# Patient Record
Sex: FEMALE | Race: WHITE | Employment: OTHER | URBAN - METROPOLITAN AREA
[De-identification: names, ages, dates, MRNs, and addresses within clinical notes are randomized per-mention and may not be internally consistent; named-entity substitution may affect disease eponyms.]

---

## 2017-02-21 ENCOUNTER — HOSPITAL ENCOUNTER (OUTPATIENT)
Dept: GENERAL RADIOLOGY | Age: 77
Discharge: HOME | End: 2017-02-21
Payer: MEDICARE

## 2017-02-21 DIAGNOSIS — R52 PAIN: ICD-10-CM

## 2017-02-21 PROCEDURE — 72110 X-RAY EXAM L-2 SPINE 4/>VWS: CPT

## 2017-06-29 ENCOUNTER — OFFICE VISIT (OUTPATIENT)
Dept: SURGERY | Age: 77
End: 2017-06-29

## 2017-06-29 VITALS
TEMPERATURE: 98.8 F | HEART RATE: 80 BPM | WEIGHT: 113 LBS | RESPIRATION RATE: 12 BRPM | DIASTOLIC BLOOD PRESSURE: 62 MMHG | HEIGHT: 60 IN | BODY MASS INDEX: 22.19 KG/M2 | SYSTOLIC BLOOD PRESSURE: 98 MMHG

## 2017-06-29 DIAGNOSIS — L98.9 SKIN LESION OF LEFT UPPER EXTREMITY: Primary | ICD-10-CM

## 2017-06-29 PROCEDURE — 99213 OFFICE O/P EST LOW 20 MIN: CPT | Performed by: SURGERY

## 2017-06-29 RX ORDER — HYDROMORPHONE HYDROCHLORIDE 4 MG/1
TABLET ORAL
COMMUNITY
Start: 2017-06-28 | End: 2018-04-11 | Stop reason: ALTCHOICE

## 2017-06-29 ASSESSMENT — ENCOUNTER SYMPTOMS
CONSTIPATION: 0
ABDOMINAL PAIN: 0
EYE PAIN: 0
VOMITING: 0
ANAL BLEEDING: 0
STRIDOR: 0
SHORTNESS OF BREATH: 0
BACK PAIN: 0
EYE DISCHARGE: 0
TROUBLE SWALLOWING: 0
CHEST TIGHTNESS: 0
COLOR CHANGE: 0

## 2017-07-06 ENCOUNTER — OFFICE VISIT (OUTPATIENT)
Dept: SURGERY | Age: 77
End: 2017-07-06

## 2017-07-06 VITALS
TEMPERATURE: 98.8 F | BODY MASS INDEX: 22.38 KG/M2 | SYSTOLIC BLOOD PRESSURE: 110 MMHG | WEIGHT: 114 LBS | DIASTOLIC BLOOD PRESSURE: 80 MMHG | HEART RATE: 96 BPM | HEIGHT: 60 IN | RESPIRATION RATE: 12 BRPM

## 2017-07-06 DIAGNOSIS — L98.9 SKIN LESION OF LEFT UPPER EXTREMITY: Primary | ICD-10-CM

## 2017-07-06 PROCEDURE — 99024 POSTOP FOLLOW-UP VISIT: CPT | Performed by: SURGERY

## 2017-09-20 LAB — PATHOLOGY REPORT: NORMAL

## 2018-04-04 ENCOUNTER — TELEPHONE (OUTPATIENT)
Dept: INFECTIOUS DISEASES | Age: 78
End: 2018-04-04

## 2018-04-04 DIAGNOSIS — R19.7 DIARRHEA IN ADULT PATIENT: Primary | ICD-10-CM

## 2018-04-04 RX ORDER — VANCOMYCIN HYDROCHLORIDE 250 MG/1
CAPSULE ORAL
Qty: 28 CAPSULE | Refills: 0 | Status: SHIPPED | OUTPATIENT
Start: 2018-04-04 | End: 2018-04-11 | Stop reason: SDUPTHER

## 2018-04-06 ENCOUNTER — TELEPHONE (OUTPATIENT)
Dept: INFECTIOUS DISEASES | Age: 78
End: 2018-04-06

## 2018-04-11 ENCOUNTER — OFFICE VISIT (OUTPATIENT)
Dept: INFECTIOUS DISEASES | Age: 78
End: 2018-04-11
Payer: MEDICARE

## 2018-04-11 VITALS
RESPIRATION RATE: 18 BRPM | BODY MASS INDEX: 20.62 KG/M2 | WEIGHT: 105 LBS | SYSTOLIC BLOOD PRESSURE: 110 MMHG | TEMPERATURE: 98.7 F | HEART RATE: 72 BPM | DIASTOLIC BLOOD PRESSURE: 68 MMHG | HEIGHT: 60 IN

## 2018-04-11 DIAGNOSIS — R10.12 LEFT UPPER QUADRANT PAIN: ICD-10-CM

## 2018-04-11 DIAGNOSIS — E55.9 VITAMIN D DEFICIENCY: ICD-10-CM

## 2018-04-11 DIAGNOSIS — R53.83 FATIGUE, UNSPECIFIED TYPE: ICD-10-CM

## 2018-04-11 DIAGNOSIS — R19.7 DIARRHEA OF PRESUMED INFECTIOUS ORIGIN: Primary | ICD-10-CM

## 2018-04-11 PROCEDURE — 99204 OFFICE O/P NEW MOD 45 MIN: CPT | Performed by: INTERNAL MEDICINE

## 2018-04-11 RX ORDER — VANCOMYCIN HYDROCHLORIDE 250 MG/1
CAPSULE ORAL
Qty: 28 CAPSULE | Refills: 0 | Status: SHIPPED | OUTPATIENT
Start: 2018-04-11 | End: 2018-04-11 | Stop reason: SDUPTHER

## 2018-04-11 RX ORDER — VANCOMYCIN HYDROCHLORIDE 250 MG/1
CAPSULE ORAL
Qty: 28 CAPSULE | Refills: 0 | Status: SHIPPED | OUTPATIENT
Start: 2018-04-11 | End: 2019-04-25 | Stop reason: ALTCHOICE

## 2018-04-13 ENCOUNTER — HOSPITAL ENCOUNTER (OUTPATIENT)
Dept: LAB | Age: 78
Discharge: HOME | End: 2018-04-13
Payer: MEDICARE

## 2018-04-13 ENCOUNTER — TELEPHONE (OUTPATIENT)
Dept: INFECTIOUS DISEASES | Age: 78
End: 2018-04-13

## 2018-04-13 PROCEDURE — 87493 C DIFF AMPLIFIED PROBE: CPT

## 2018-04-14 LAB — CLOSTRIDIUM DIFFICILE DNA AMPLIFICATION: NORMAL

## 2018-04-17 ENCOUNTER — HOSPITAL ENCOUNTER (OUTPATIENT)
Dept: LAB | Age: 78
Discharge: HOME | End: 2018-04-17
Payer: MEDICARE

## 2018-04-17 LAB
ALBUMIN SERPL-MCNC: 4.6 G/DL (ref 3.9–4.9)
ALP BLD-CCNC: 75 U/L (ref 40–130)
ALT SERPL-CCNC: 48 U/L (ref 0–33)
ANION GAP SERPL CALCULATED.3IONS-SCNC: 13 MEQ/L (ref 7–13)
AST SERPL-CCNC: 33 U/L (ref 0–35)
BASOPHILS ABSOLUTE: 0 K/UL (ref 0–0.2)
BASOPHILS RELATIVE PERCENT: 0.8 %
BILIRUB SERPL-MCNC: 0.3 MG/DL (ref 0–1.2)
BUN BLDV-MCNC: 15 MG/DL (ref 8–23)
CALCIUM SERPL-MCNC: 9.7 MG/DL (ref 8.6–10.2)
CHLORIDE BLD-SCNC: 106 MEQ/L (ref 98–107)
CO2: 26 MEQ/L (ref 22–29)
CREAT SERPL-MCNC: 0.63 MG/DL (ref 0.5–0.9)
EOSINOPHILS ABSOLUTE: 0.1 K/UL (ref 0–0.7)
EOSINOPHILS RELATIVE PERCENT: 2.4 %
GFR AFRICAN AMERICAN: >60
GFR NON-AFRICAN AMERICAN: >60
GLOBULIN: 2.2 G/DL (ref 2.3–3.5)
GLUCOSE BLD-MCNC: 53 MG/DL (ref 74–109)
HCT VFR BLD CALC: 46.3 % (ref 37–47)
HEMOGLOBIN: 15.9 G/DL (ref 12–16)
LIPASE: 8 U/L (ref 13–60)
LYMPHOCYTES ABSOLUTE: 1.5 K/UL (ref 1–4.8)
LYMPHOCYTES RELATIVE PERCENT: 25 %
MCH RBC QN AUTO: 32.4 PG (ref 27–31.3)
MCHC RBC AUTO-ENTMCNC: 34.3 % (ref 33–37)
MCV RBC AUTO: 94.6 FL (ref 82–100)
MONOCYTES ABSOLUTE: 0.5 K/UL (ref 0.2–0.8)
MONOCYTES RELATIVE PERCENT: 8.1 %
NEUTROPHILS ABSOLUTE: 3.7 K/UL (ref 1.4–6.5)
NEUTROPHILS RELATIVE PERCENT: 63.7 %
PDW BLD-RTO: 13.3 % (ref 11.5–14.5)
PLATELET # BLD: 131 K/UL (ref 130–400)
POTASSIUM SERPL-SCNC: 4 MEQ/L (ref 3.5–5.1)
RBC # BLD: 4.89 M/UL (ref 4.2–5.4)
SODIUM BLD-SCNC: 145 MEQ/L (ref 132–144)
TOTAL PROTEIN: 6.8 G/DL (ref 6.4–8.1)
TSH REFLEX: 2.55 UIU/ML (ref 0.27–4.2)
VITAMIN D 25-HYDROXY: 9.5 NG/ML (ref 30–100)
WBC # BLD: 5.9 K/UL (ref 4.8–10.8)

## 2018-04-17 PROCEDURE — 80053 COMPREHEN METABOLIC PANEL: CPT

## 2018-04-17 PROCEDURE — 82306 VITAMIN D 25 HYDROXY: CPT

## 2018-04-17 PROCEDURE — 83690 ASSAY OF LIPASE: CPT

## 2018-04-17 PROCEDURE — 84443 ASSAY THYROID STIM HORMONE: CPT

## 2018-04-17 PROCEDURE — 85025 COMPLETE CBC W/AUTO DIFF WBC: CPT

## 2018-04-21 LAB
VITAMIN D2 AND D3, TOTAL: 7.2 NG/ML (ref 30–80)
VITAMIN D2, 25 HYDROXY: <1 NG/ML
VITAMIN D3,25 HYDROXY: 7.2 NG/ML

## 2018-04-27 ENCOUNTER — OFFICE VISIT (OUTPATIENT)
Dept: INFECTIOUS DISEASES | Age: 78
End: 2018-04-27
Payer: MEDICARE

## 2018-04-27 VITALS
RESPIRATION RATE: 16 BRPM | SYSTOLIC BLOOD PRESSURE: 112 MMHG | HEIGHT: 60 IN | WEIGHT: 110 LBS | TEMPERATURE: 98.4 F | HEART RATE: 60 BPM | BODY MASS INDEX: 21.6 KG/M2 | DIASTOLIC BLOOD PRESSURE: 65 MMHG

## 2018-04-27 DIAGNOSIS — R19.7 DIARRHEA OF PRESUMED INFECTIOUS ORIGIN: ICD-10-CM

## 2018-04-27 DIAGNOSIS — R79.89 LOW VITAMIN D LEVEL: Primary | ICD-10-CM

## 2018-04-27 PROCEDURE — 99214 OFFICE O/P EST MOD 30 MIN: CPT | Performed by: INTERNAL MEDICINE

## 2018-04-27 RX ORDER — ERGOCALCIFEROL (VITAMIN D2) 1250 MCG
50000 CAPSULE ORAL WEEKLY
Qty: 4 CAPSULE | Refills: 2 | Status: SHIPPED | OUTPATIENT
Start: 2018-04-27 | End: 2019-04-25 | Stop reason: ALTCHOICE

## 2018-07-10 ENCOUNTER — OFFICE VISIT (OUTPATIENT)
Dept: OBGYN CLINIC | Age: 78
End: 2018-07-10
Payer: MEDICARE

## 2018-07-10 VITALS
HEIGHT: 60 IN | BODY MASS INDEX: 21.4 KG/M2 | WEIGHT: 109 LBS | SYSTOLIC BLOOD PRESSURE: 124 MMHG | DIASTOLIC BLOOD PRESSURE: 80 MMHG

## 2018-07-10 DIAGNOSIS — Z01.419 WELL WOMAN EXAM WITH ROUTINE GYNECOLOGICAL EXAM: Primary | ICD-10-CM

## 2018-07-10 DIAGNOSIS — Z12.39 ENCOUNTER FOR BREAST CANCER SCREENING OTHER THAN MAMMOGRAM: ICD-10-CM

## 2018-07-10 DIAGNOSIS — N95.9 MENOPAUSAL AND PERIMENOPAUSAL DISORDER: ICD-10-CM

## 2018-07-10 DIAGNOSIS — Z87.81 HX OF FRACTURE: ICD-10-CM

## 2018-07-10 DIAGNOSIS — Z11.51 SCREENING FOR HPV (HUMAN PAPILLOMAVIRUS): ICD-10-CM

## 2018-07-10 PROCEDURE — 99387 INIT PM E/M NEW PAT 65+ YRS: CPT | Performed by: OBSTETRICS & GYNECOLOGY

## 2018-07-10 RX ORDER — TRAZODONE HYDROCHLORIDE 50 MG/1
TABLET ORAL
Refills: 2 | COMMUNITY
Start: 2018-06-12 | End: 2019-04-25

## 2018-07-10 RX ORDER — ROSUVASTATIN CALCIUM 10 MG/1
TABLET, COATED ORAL
COMMUNITY

## 2018-07-10 RX ORDER — HYDROMORPHONE HYDROCHLORIDE 2 MG/1
TABLET ORAL
Refills: 0 | COMMUNITY
Start: 2018-06-18 | End: 2019-04-25 | Stop reason: ALTCHOICE

## 2018-07-10 RX ORDER — METOPROLOL SUCCINATE 50 MG/1
50 TABLET, EXTENDED RELEASE ORAL NIGHTLY
COMMUNITY
Start: 2009-04-09

## 2018-07-10 RX ORDER — ASPIRIN 81 MG/1
TABLET ORAL
COMMUNITY
Start: 2010-08-18

## 2018-07-10 ASSESSMENT — ENCOUNTER SYMPTOMS
ABDOMINAL PAIN: 0
NAUSEA: 0
BLOOD IN STOOL: 0
VOMITING: 0
WHEEZING: 0
SHORTNESS OF BREATH: 0
COUGH: 0
SINUS PRESSURE: 0
CONSTIPATION: 0
COLOR CHANGE: 0

## 2018-07-10 NOTE — PROGRESS NOTES
 Alcohol use No    Drug use: No    Sexual activity: Not Currently     Other Topics Concern    Not on file     Social History Narrative    No narrative on file         MEDICATIONS:  Current Outpatient Prescriptions on File Prior to Visit   Medication Sig Dispense Refill    ergocalciferol (ERGOCALCIFEROL) 57895 units capsule Take 1 capsule by mouth once a week 4 capsule 2    vancomycin (VANCOCIN HCL) 250 MG capsule TAKE 250 MG ,PO, QID,FOR X7 DAYS 28 capsule 0     No current facility-administered medications on file prior to visit. ALLERGIES:  Allergies as of 07/10/2018 - Review Complete 07/10/2018   Allergen Reaction Noted    Zithromax [azithromycin] Other (See Comments) 07/10/2018    Flexeril [cyclobenzaprine hcl]  07/03/2012           Gynecologic History:     No LMP recorded. Patient has had a hysterectomy. ________________________________________________________________________  REVIEW OF SYSTEMS:       Review of Systems   Constitutional: Negative for chills, fatigue, fever and unexpected weight change. HENT: Negative for hearing loss, sinus pressure and tinnitus. Eyes: Negative for visual disturbance. Respiratory: Negative for cough, shortness of breath and wheezing. Cardiovascular: Negative for chest pain, palpitations and leg swelling. Gastrointestinal: Negative for abdominal pain, blood in stool, constipation, nausea and vomiting. Genitourinary: Negative for difficulty urinating, dysuria, flank pain, hematuria, pelvic pain and vaginal discharge. Musculoskeletal: Negative for arthralgias and neck stiffness. Skin: Negative for color change, pallor and rash. Allergic/Immunologic: Negative for food allergies. Neurological: Negative for dizziness, speech difficulty, weakness and numbness. Psychiatric/Behavioral: Negative for behavioral problems, self-injury and suicidal ideas. The patient is not nervous/anxious. DIGITAL SCREEN W CAD BILATERAL                  Hereditary Breast, Ovarian, Colon and Uterine Cancer screening Done. Tobacco & Secondary smoke risks reviewed; instructed on cessation and avoidance      Counseling Completed:          PLAN:    Return in about 1 year (around 7/10/2019) for annual.  Repeat Annual every 1 year  Cervical Cytology Evaluation begins at 24years old. If Negative Cytology, Follow-up screening per current guidelines. Mammograms every 1 year. If 35 yo and last mammogram was negative. Calcium and Vitamin D dosing reviewed. Colonoscopy screening reviewed as well as onset for bone density testing. Birth control and barrier recommendations discussed. STD counseling and prevention reviewed. Gardisil counseling completed for all patients 7-35 yo. Routine health maintenance per patients PCP. Orders Placed This Encounter   Procedures    DUC DIGITAL SCREEN W CAD BILATERAL     Standing Status:   Future     Standing Expiration Date:   7/10/2019     Order Specific Question:   Reason for exam:     Answer:   BREAST CANCER SCREENING    PAP SMEAR     Standing Status:   Future     Standing Expiration Date:   7/10/2019     Order Specific Question:   Collection Type     Answer: Thin Prep     Order Specific Question:   Prior Abnormal Pap Test     Answer:   No     Order Specific Question:   Screening or Diagnostic     Answer:   Screening     Order Specific Question:   HPV Requested? Answer:   Yes     Comments:   HPV 16/18     Order Specific Question:   High Risk Patient     Answer:   N/A     No orders of the defined types were placed in this encounter. ISweta, am scribing for, and in the presence of, Dr Andre Cross. Electronically signed by: Sweta Rivera 7/10/18 1:03 PM    Dr Andre PAYNE, personally performed the services described in this documentation, as scribed by Sweta Rivera in my presence, and it is both accurate and complete.  Electronically signed

## 2018-07-12 LAB — VITAMIN D 1,25-DIHYDROXY: 45.4 PG/ML (ref 19.9–79.3)

## 2018-07-17 ENCOUNTER — HOSPITAL ENCOUNTER (OUTPATIENT)
Dept: WOMENS IMAGING | Age: 78
Discharge: HOME | End: 2018-07-19
Payer: MEDICARE

## 2018-07-17 DIAGNOSIS — Z87.81 HX OF FRACTURE: ICD-10-CM

## 2018-07-17 DIAGNOSIS — Z12.39 ENCOUNTER FOR BREAST CANCER SCREENING OTHER THAN MAMMOGRAM: ICD-10-CM

## 2018-07-17 DIAGNOSIS — N95.9 MENOPAUSAL AND PERIMENOPAUSAL DISORDER: ICD-10-CM

## 2018-07-17 PROCEDURE — 77067 SCR MAMMO BI INCL CAD: CPT

## 2018-07-17 PROCEDURE — 77080 DXA BONE DENSITY AXIAL: CPT

## 2018-07-18 DIAGNOSIS — Z01.419 WELL WOMAN EXAM WITH ROUTINE GYNECOLOGICAL EXAM: ICD-10-CM

## 2018-07-18 DIAGNOSIS — Z11.51 SCREENING FOR HPV (HUMAN PAPILLOMAVIRUS): ICD-10-CM

## 2018-07-20 ENCOUNTER — HOSPITAL ENCOUNTER (OUTPATIENT)
Dept: LAB | Age: 78
Discharge: HOME | End: 2018-07-20
Payer: MEDICARE

## 2018-07-20 LAB
ALBUMIN SERPL-MCNC: 4.2 G/DL (ref 3.9–4.9)
ALP BLD-CCNC: 70 U/L (ref 40–130)
ALT SERPL-CCNC: 40 U/L (ref 0–33)
ANION GAP SERPL CALCULATED.3IONS-SCNC: 12 MEQ/L (ref 7–13)
ANISOCYTOSIS: ABNORMAL
AST SERPL-CCNC: 26 U/L (ref 0–35)
BANDED NEUTROPHILS RELATIVE PERCENT: 5 % (ref 5–11)
BASOPHILS ABSOLUTE: 0.2 K/UL (ref 0–0.2)
BASOPHILS RELATIVE PERCENT: 4 %
BILIRUB SERPL-MCNC: 0.6 MG/DL (ref 0–1.2)
BUN BLDV-MCNC: 17 MG/DL (ref 8–23)
CALCIUM SERPL-MCNC: 9.2 MG/DL (ref 8.6–10.2)
CHLORIDE BLD-SCNC: 102 MEQ/L (ref 98–107)
CHOLESTEROL, TOTAL: 122 MG/DL (ref 0–199)
CO2: 27 MEQ/L (ref 22–29)
CREAT SERPL-MCNC: 0.57 MG/DL (ref 0.5–0.9)
EOSINOPHILS ABSOLUTE: 0.1 K/UL (ref 0–0.7)
EOSINOPHILS RELATIVE PERCENT: 3 %
GFR AFRICAN AMERICAN: >60
GFR NON-AFRICAN AMERICAN: >60
GLOBULIN: 2.6 G/DL (ref 2.3–3.5)
GLUCOSE BLD-MCNC: 72 MG/DL (ref 74–109)
HBA1C MFR BLD: 5.5 % (ref 4.8–5.9)
HCT VFR BLD CALC: 43.8 % (ref 37–47)
HDLC SERPL-MCNC: 56 MG/DL (ref 40–59)
HEMOGLOBIN: 15 G/DL (ref 12–16)
LDL CHOLESTEROL CALCULATED: 52 MG/DL (ref 0–129)
LYMPHOCYTES ABSOLUTE: 1.5 K/UL (ref 1–4.8)
LYMPHOCYTES RELATIVE PERCENT: 32 %
MCH RBC QN AUTO: 32.7 PG (ref 27–31.3)
MCHC RBC AUTO-ENTMCNC: 34.3 % (ref 33–37)
MCV RBC AUTO: 95.2 FL (ref 82–100)
MONOCYTES ABSOLUTE: 0.5 K/UL (ref 0.2–0.8)
MONOCYTES RELATIVE PERCENT: 9.5 %
NEUTROPHILS ABSOLUTE: 2.4 K/UL (ref 1.4–6.5)
NEUTROPHILS RELATIVE PERCENT: 47 %
PDW BLD-RTO: 13.5 % (ref 11.5–14.5)
PLATELET # BLD: 94 K/UL (ref 130–400)
PLATELET SLIDE REVIEW: ABNORMAL
POIKILOCYTES: ABNORMAL
POTASSIUM SERPL-SCNC: 3.8 MEQ/L (ref 3.5–5.1)
RBC # BLD: 4.6 M/UL (ref 4.2–5.4)
SODIUM BLD-SCNC: 141 MEQ/L (ref 132–144)
T3 TOTAL: 1.35 NG/ML (ref 0.8–2)
TOTAL PROTEIN: 6.8 G/DL (ref 6.4–8.1)
TRIGL SERPL-MCNC: 68 MG/DL (ref 0–200)
TSH REFLEX: 2.78 UIU/ML (ref 0.27–4.2)
WBC # BLD: 4.6 K/UL (ref 4.8–10.8)

## 2018-07-20 PROCEDURE — 85025 COMPLETE CBC W/AUTO DIFF WBC: CPT

## 2018-07-20 PROCEDURE — 84443 ASSAY THYROID STIM HORMONE: CPT

## 2018-07-20 PROCEDURE — 84480 ASSAY TRIIODOTHYRONINE (T3): CPT

## 2018-07-20 PROCEDURE — 80053 COMPREHEN METABOLIC PANEL: CPT

## 2018-07-20 PROCEDURE — 80061 LIPID PANEL: CPT

## 2018-07-20 PROCEDURE — 83036 HEMOGLOBIN GLYCOSYLATED A1C: CPT

## 2018-08-07 ENCOUNTER — OFFICE VISIT (OUTPATIENT)
Dept: OBGYN CLINIC | Age: 78
End: 2018-08-07
Payer: MEDICARE

## 2018-08-07 VITALS
DIASTOLIC BLOOD PRESSURE: 72 MMHG | SYSTOLIC BLOOD PRESSURE: 128 MMHG | WEIGHT: 110.4 LBS | HEIGHT: 60 IN | BODY MASS INDEX: 21.68 KG/M2

## 2018-08-07 DIAGNOSIS — M81.0 OSTEOPOROSIS, UNSPECIFIED OSTEOPOROSIS TYPE, UNSPECIFIED PATHOLOGICAL FRACTURE PRESENCE: ICD-10-CM

## 2018-08-07 DIAGNOSIS — R87.612 LGSIL OF CERVIX OF UNDETERMINED SIGNIFICANCE: Primary | ICD-10-CM

## 2018-08-07 PROCEDURE — 99213 OFFICE O/P EST LOW 20 MIN: CPT | Performed by: OBSTETRICS & GYNECOLOGY

## 2018-08-07 RX ORDER — NICOTINE 21 MG/24HR
1 PATCH, TRANSDERMAL 24 HOURS TRANSDERMAL
COMMUNITY
Start: 2018-07-24

## 2018-08-07 ASSESSMENT — ENCOUNTER SYMPTOMS
SHORTNESS OF BREATH: 0
COUGH: 0
BLOOD IN STOOL: 0
ABDOMINAL PAIN: 0
WHEEZING: 0
DIARRHEA: 0
CONSTIPATION: 0

## 2018-09-19 ENCOUNTER — HOSPITAL ENCOUNTER (OUTPATIENT)
Dept: LAB | Age: 78
Discharge: HOME | End: 2018-09-19
Payer: MEDICARE

## 2018-11-13 ENCOUNTER — PROCEDURE VISIT (OUTPATIENT)
Dept: OBGYN CLINIC | Age: 78
End: 2018-11-13
Payer: MEDICARE

## 2018-11-13 VITALS — SYSTOLIC BLOOD PRESSURE: 126 MMHG | WEIGHT: 100 LBS | DIASTOLIC BLOOD PRESSURE: 82 MMHG | BODY MASS INDEX: 19.53 KG/M2

## 2018-11-13 DIAGNOSIS — R87.622 LGSIL PAP SMEAR OF VAGINA: Primary | ICD-10-CM

## 2018-11-13 DIAGNOSIS — Z23 NEED FOR INFLUENZA VACCINATION: ICD-10-CM

## 2018-11-13 PROCEDURE — G0008 ADMIN INFLUENZA VIRUS VAC: HCPCS | Performed by: OBSTETRICS & GYNECOLOGY

## 2018-11-13 PROCEDURE — 57420 EXAM OF VAGINA W/SCOPE: CPT | Performed by: OBSTETRICS & GYNECOLOGY

## 2018-11-13 PROCEDURE — 90662 IIV NO PRSV INCREASED AG IM: CPT | Performed by: OBSTETRICS & GYNECOLOGY

## 2018-11-13 ASSESSMENT — ENCOUNTER SYMPTOMS
RESPIRATORY NEGATIVE: 1
CONSTIPATION: 0
BLOOD IN STOOL: 0
VOMITING: 0
DIARRHEA: 0
EYES NEGATIVE: 1
ANAL BLEEDING: 0
NAUSEA: 0
ABDOMINAL PAIN: 0
RECTAL PAIN: 0
ABDOMINAL DISTENTION: 0
ALLERGIC/IMMUNOLOGIC NEGATIVE: 1

## 2018-11-13 NOTE — PROGRESS NOTES
Pt here for colposcopy today for LGSIL of vaginal cuff. HPV negative. Discussed procedure as well as abnormal paps and HPV in detail. Consents signed. Colposcopy of vaginal cuff negative and no bx done. Discussed with patient and will F/U pap 6 months. All questions answered. Vitals:  /82   Wt 100 lb (45.4 kg)   BMI 19.53 kg/m²   Past Medical History:   Diagnosis Date    C. difficile colitis     Hyperlipidemia     Hypertension     Osteoporosis 2018     Past Surgical History:   Procedure Laterality Date    COLONOSCOPY  1/25/16    fegal transplant     HYSTERECTOMY      OTHER SURGICAL HISTORY Left 07/03/2017    Yonny Kelley MD EXCISION SKIN LESION L FOREARM    PANCREAS SURGERY       Allergies:  Zithromax [azithromycin] and Flexeril [cyclobenzaprine hcl]  Current Outpatient Prescriptions   Medication Sig Dispense Refill    denosumab (PROLIA) 60 MG/ML SOLN SC injection Inject 1 mL into the skin once for 1 dose 1 mL 1    nicotine (NICODERM CQ) 21 MG/24HR Place 1 patch onto the skin      HYDROmorphone (DILAUDID) 2 MG tablet TAKE 1 & 1/2 (ONE AND ONE-HALF) TABLETS BY MOUTH ONCE DAILY AS NEEDED FOR PAIN  0    aspirin (ECOTRIN LOW STRENGTH) 81 MG EC tablet Take by mouth      rosuvastatin (CRESTOR) 10 MG tablet 1 TABLET DAILY      metoprolol succinate (TOPROL XL) 50 MG extended release tablet Take by mouth      traZODone (DESYREL) 50 MG tablet Take 2 tablet by mouth at bedtime as needed  2    lipase-protease-amylase (CREON) 19609-30575 units delayed release capsule Take by mouth      LYRICA 150 MG capsule Take 1 capsule by mouth three times a day  2    Misc. Devices (ADAPTER 2 RED TAPPETS) MISC by Does not apply route      ergocalciferol (ERGOCALCIFEROL) 38399 units capsule Take 1 capsule by mouth once a week 4 capsule 2    vancomycin (VANCOCIN HCL) 250 MG capsule TAKE 250 MG ,PO, QID,FOR X7 DAYS 28 capsule 0     No current facility-administered medications for this visit.

## 2019-07-22 ENCOUNTER — OFFICE VISIT (OUTPATIENT)
Dept: OBGYN CLINIC | Age: 79
End: 2019-07-22

## 2019-07-22 VITALS
WEIGHT: 120 LBS | SYSTOLIC BLOOD PRESSURE: 114 MMHG | HEIGHT: 60 IN | DIASTOLIC BLOOD PRESSURE: 66 MMHG | BODY MASS INDEX: 23.56 KG/M2

## 2019-07-22 DIAGNOSIS — Z12.31 SCREENING MAMMOGRAM, ENCOUNTER FOR: ICD-10-CM

## 2019-07-22 DIAGNOSIS — Z01.419 ENCOUNTER FOR WELL WOMAN EXAM: Primary | ICD-10-CM

## 2019-07-22 PROCEDURE — 99397 PER PM REEVAL EST PAT 65+ YR: CPT | Performed by: OBSTETRICS & GYNECOLOGY

## 2019-07-22 ASSESSMENT — ENCOUNTER SYMPTOMS
CONSTIPATION: 0
ANAL BLEEDING: 0
ALLERGIC/IMMUNOLOGIC NEGATIVE: 1
NAUSEA: 0
ABDOMINAL DISTENTION: 0
DIARRHEA: 0
EYES NEGATIVE: 1
ABDOMINAL PAIN: 0
RESPIRATORY NEGATIVE: 1
BLOOD IN STOOL: 0
VOMITING: 0
RECTAL PAIN: 0

## 2019-07-22 NOTE — PROGRESS NOTES
The patient was asked if she would like a chaperone present for her intimate exam. She  Declined the chaperone.  Mark Carlos

## 2019-08-26 ENCOUNTER — HOSPITAL ENCOUNTER (OUTPATIENT)
Dept: WOMENS IMAGING | Age: 79
Discharge: HOME | End: 2019-08-28
Payer: MEDICARE

## 2019-08-26 DIAGNOSIS — Z12.31 SCREENING MAMMOGRAM, ENCOUNTER FOR: ICD-10-CM

## 2019-08-26 PROCEDURE — 77067 SCR MAMMO BI INCL CAD: CPT

## 2020-07-23 ENCOUNTER — OFFICE VISIT (OUTPATIENT)
Dept: OBGYN CLINIC | Age: 80
End: 2020-07-23
Payer: MEDICARE

## 2020-07-23 VITALS
BODY MASS INDEX: 22.84 KG/M2 | DIASTOLIC BLOOD PRESSURE: 76 MMHG | SYSTOLIC BLOOD PRESSURE: 130 MMHG | WEIGHT: 121 LBS | HEIGHT: 61 IN

## 2020-07-23 PROCEDURE — 99397 PER PM REEVAL EST PAT 65+ YR: CPT | Performed by: OBSTETRICS & GYNECOLOGY

## 2020-07-23 ASSESSMENT — ENCOUNTER SYMPTOMS
ALLERGIC/IMMUNOLOGIC NEGATIVE: 1
NAUSEA: 0
VOMITING: 0
EYES NEGATIVE: 1
DIARRHEA: 0
CONSTIPATION: 0
ABDOMINAL DISTENTION: 0
RECTAL PAIN: 0
ANAL BLEEDING: 0
ABDOMINAL PAIN: 0
RESPIRATORY NEGATIVE: 1
BLOOD IN STOOL: 0

## 2020-07-23 NOTE — PROGRESS NOTES
Not on file     Inability: Not on file    Transportation needs     Medical: Not on file     Non-medical: Not on file   Tobacco Use    Smoking status: Current Every Day Smoker     Packs/day: 0.50     Types: Cigarettes     Start date: 1955    Smokeless tobacco: Never Used   Substance and Sexual Activity    Alcohol use: No     Alcohol/week: 0.0 standard drinks    Drug use: No    Sexual activity: Not Currently   Lifestyle    Physical activity     Days per week: Not on file     Minutes per session: Not on file    Stress: Not on file   Relationships    Social connections     Talks on phone: Not on file     Gets together: Not on file     Attends Anabaptism service: Not on file     Active member of club or organization: Not on file     Attends meetings of clubs or organizations: Not on file     Relationship status: Not on file    Intimate partner violence     Fear of current or ex partner: Not on file     Emotionally abused: Not on file     Physically abused: Not on file     Forced sexual activity: Not on file   Other Topics Concern    Not on file   Social History Narrative    Not on file     Family History   Problem Relation Age of Onset    Cervical Cancer Mother     Colon Cancer Sister     Breast Cancer Neg Hx     Cancer Neg Hx     Diabetes Neg Hx     Hypertension Neg Hx     Eclampsia Neg Hx     Ovarian Cancer Neg Hx      Labor Neg Hx     Spont Abortions Neg Hx     Stroke Neg Hx        Review of Systems   Constitutional: Negative. Negative for activity change, appetite change, chills, diaphoresis, fatigue, fever and unexpected weight change. HENT: Negative. Eyes: Negative. Respiratory: Negative. Cardiovascular: Negative. Gastrointestinal: Negative for abdominal distention, abdominal pain, anal bleeding, blood in stool, constipation, diarrhea, nausea, rectal pain and vomiting. Endocrine: Negative.     Genitourinary: Negative for decreased urine volume, difficulty placedthis encounter:  No orders of the defined types were placed in this encounter. Orders placedthis encounter:  Orders Placed This Encounter   Procedures    DUC DIGITAL SCREEN W OR WO CAD BILATERAL     Standing Status:   Future     Standing Expiration Date:   7/23/2021         Follow up: Annual exam with PCP after this year ( breast exam and mammogram as recommended). GYN consult only as needed for GYN issues. The patient was asked if she would like a chaperone present for her intimate exam. She  Declined the chaperone.  Octaviano Osler

## 2023-10-23 PROBLEM — I20.9 ANGINA, CLASS IV (CMS-HCC): Status: ACTIVE | Noted: 2023-10-23

## 2023-10-23 PROBLEM — I10 BENIGN ESSENTIAL HYPERTENSION: Status: ACTIVE | Noted: 2023-10-23

## 2023-10-23 PROBLEM — R00.2 PALPITATIONS: Status: ACTIVE | Noted: 2023-10-23

## 2023-10-23 PROBLEM — I89.0 LYMPHEDEMA: Status: ACTIVE | Noted: 2023-10-23

## 2023-10-23 PROBLEM — R07.9 CARDIAC CHEST PAIN: Status: ACTIVE | Noted: 2023-10-23

## 2023-10-23 PROBLEM — I25.2 HISTORY OF MI (MYOCARDIAL INFARCTION): Status: ACTIVE | Noted: 2023-10-23

## 2023-10-23 PROBLEM — R55 FAINTING: Status: ACTIVE | Noted: 2023-10-23

## 2023-10-23 PROBLEM — D69.3 IDIOPATHIC THROMBOCYTOPENIC PURPURA (MULTI): Status: ACTIVE | Noted: 2023-10-23

## 2023-10-23 PROBLEM — G89.29 CHRONIC RIGHT-SIDED LOW BACK PAIN WITH RIGHT-SIDED SCIATICA: Status: ACTIVE | Noted: 2023-10-23

## 2023-10-23 PROBLEM — I87.309 CHRONIC PERIPHERAL VENOUS HYPERTENSION: Status: ACTIVE | Noted: 2023-10-23

## 2023-10-23 PROBLEM — E78.2 MIXED HYPERLIPIDEMIA: Status: ACTIVE | Noted: 2023-10-23

## 2023-10-23 PROBLEM — J44.9 CHRONIC OBSTRUCTIVE PULMONARY DISEASE (MULTI): Status: ACTIVE | Noted: 2023-10-23

## 2023-10-23 PROBLEM — M79.604 PAIN OF RIGHT LOWER EXTREMITY: Status: ACTIVE | Noted: 2023-10-23

## 2023-10-23 PROBLEM — I87.2 CHRONIC VENOUS INSUFFICIENCY: Status: ACTIVE | Noted: 2023-10-23

## 2023-10-23 PROBLEM — Z98.61 CAD S/P PERCUTANEOUS CORONARY ANGIOPLASTY: Status: ACTIVE | Noted: 2023-10-23

## 2023-10-23 PROBLEM — I25.10 CAD S/P PERCUTANEOUS CORONARY ANGIOPLASTY: Status: ACTIVE | Noted: 2023-10-23

## 2023-10-23 PROBLEM — F17.210 CIGARETTE SMOKER: Status: ACTIVE | Noted: 2023-10-23

## 2023-10-23 PROBLEM — R06.89 DIFFICULTY BREATHING: Status: ACTIVE | Noted: 2023-10-23

## 2023-10-23 PROBLEM — I47.10 PAROXYSMAL SVT (SUPRAVENTRICULAR TACHYCARDIA) (CMS-HCC): Status: ACTIVE | Noted: 2023-10-23

## 2023-10-23 PROBLEM — M54.41 CHRONIC RIGHT-SIDED LOW BACK PAIN WITH RIGHT-SIDED SCIATICA: Status: ACTIVE | Noted: 2023-10-23

## 2023-10-23 RX ORDER — NITROGLYCERIN 0.4 MG/1
TABLET SUBLINGUAL
COMMUNITY
Start: 2016-05-11 | End: 2024-05-30 | Stop reason: SDUPTHER

## 2023-10-23 RX ORDER — ROSUVASTATIN CALCIUM 10 MG/1
1 TABLET, COATED ORAL DAILY
COMMUNITY
Start: 2016-09-27 | End: 2024-03-19 | Stop reason: SDUPTHER

## 2023-10-23 RX ORDER — TRAZODONE HYDROCHLORIDE 50 MG/1
1 TABLET ORAL DAILY
COMMUNITY
Start: 2016-05-02

## 2023-10-23 RX ORDER — METOPROLOL SUCCINATE 50 MG/1
TABLET, EXTENDED RELEASE ORAL
COMMUNITY
Start: 2022-04-14 | End: 2024-03-19 | Stop reason: SDUPTHER

## 2023-10-25 ENCOUNTER — APPOINTMENT (OUTPATIENT)
Dept: CARDIOLOGY | Facility: CLINIC | Age: 83
End: 2023-10-25
Payer: MEDICARE

## 2023-11-01 ENCOUNTER — APPOINTMENT (OUTPATIENT)
Dept: CARDIOLOGY | Facility: CLINIC | Age: 83
End: 2023-11-01
Payer: MEDICARE

## 2023-11-20 ENCOUNTER — OFFICE VISIT (OUTPATIENT)
Dept: CARDIOLOGY | Facility: CLINIC | Age: 83
End: 2023-11-20
Payer: MEDICARE

## 2023-11-20 VITALS
WEIGHT: 97.7 LBS | HEIGHT: 60 IN | SYSTOLIC BLOOD PRESSURE: 140 MMHG | BODY MASS INDEX: 19.18 KG/M2 | HEART RATE: 73 BPM | DIASTOLIC BLOOD PRESSURE: 68 MMHG

## 2023-11-20 DIAGNOSIS — M79.604 PAIN OF RIGHT LOWER EXTREMITY: ICD-10-CM

## 2023-11-20 DIAGNOSIS — I47.10 PAROXYSMAL SVT (SUPRAVENTRICULAR TACHYCARDIA) (CMS-HCC): ICD-10-CM

## 2023-11-20 DIAGNOSIS — I25.2 HISTORY OF MI (MYOCARDIAL INFARCTION): ICD-10-CM

## 2023-11-20 DIAGNOSIS — F17.210 CIGARETTE SMOKER: ICD-10-CM

## 2023-11-20 DIAGNOSIS — I87.309 CHRONIC PERIPHERAL VENOUS HYPERTENSION: ICD-10-CM

## 2023-11-20 DIAGNOSIS — Z98.61 CAD S/P PERCUTANEOUS CORONARY ANGIOPLASTY: ICD-10-CM

## 2023-11-20 DIAGNOSIS — I20.9 ANGINA, CLASS IV (CMS-HCC): ICD-10-CM

## 2023-11-20 DIAGNOSIS — I73.9 CLAUDICATION (CMS-HCC): ICD-10-CM

## 2023-11-20 DIAGNOSIS — R07.9 CARDIAC CHEST PAIN: ICD-10-CM

## 2023-11-20 DIAGNOSIS — I87.2 CHRONIC VENOUS INSUFFICIENCY: ICD-10-CM

## 2023-11-20 DIAGNOSIS — R00.2 PALPITATIONS: ICD-10-CM

## 2023-11-20 DIAGNOSIS — I10 BENIGN ESSENTIAL HYPERTENSION: ICD-10-CM

## 2023-11-20 DIAGNOSIS — I25.10 CAD S/P PERCUTANEOUS CORONARY ANGIOPLASTY: ICD-10-CM

## 2023-11-20 DIAGNOSIS — R09.89 DECREASED PEDAL PULSES: ICD-10-CM

## 2023-11-20 DIAGNOSIS — E78.2 MIXED HYPERLIPIDEMIA: ICD-10-CM

## 2023-11-20 PROCEDURE — 99214 OFFICE O/P EST MOD 30 MIN: CPT | Performed by: INTERNAL MEDICINE

## 2023-11-20 PROCEDURE — 3077F SYST BP >= 140 MM HG: CPT | Performed by: INTERNAL MEDICINE

## 2023-11-20 PROCEDURE — 3078F DIAST BP <80 MM HG: CPT | Performed by: INTERNAL MEDICINE

## 2023-11-20 PROCEDURE — 1159F MED LIST DOCD IN RCRD: CPT | Performed by: INTERNAL MEDICINE

## 2023-11-20 RX ORDER — POTASSIUM CHLORIDE 20 MEQ/1
20 TABLET, EXTENDED RELEASE ORAL
COMMUNITY
End: 2024-05-30 | Stop reason: WASHOUT

## 2023-11-20 RX ORDER — BUDESONIDE AND FORMOTEROL FUMARATE DIHYDRATE 160; 4.5 UG/1; UG/1
2 AEROSOL RESPIRATORY (INHALATION) 2 TIMES DAILY
COMMUNITY
Start: 2023-01-19 | End: 2024-05-30 | Stop reason: WASHOUT

## 2023-11-20 RX ORDER — ALBUTEROL SULFATE 90 UG/1
2 AEROSOL, METERED RESPIRATORY (INHALATION) EVERY 4 HOURS PRN
COMMUNITY
Start: 2023-01-19

## 2023-11-20 RX ORDER — GABAPENTIN 300 MG/1
300 CAPSULE ORAL DAILY PRN
COMMUNITY
Start: 2023-09-06 | End: 2024-03-04

## 2023-11-20 RX ORDER — ASPIRIN 81 MG/1
81 TABLET ORAL ONCE
COMMUNITY
Start: 2010-08-18

## 2023-11-20 NOTE — PATIENT INSTRUCTIONS
Continue same medications/treatment.  Patient educated on proper medication use.  Patient educated on risk factor modification.  Please bring any lab results from other providers/physicians to your next appointment.    Please bring all medicines, vitamins, and herbal supplements with you when you come to the office.    Prescriptions will not be filled unless you are compliant with your follow up appointments or have a follow up appointment scheduled as per instruction of your physician. Refills should be requested at the time of your visit.    Follow up in 1 year  CT scan of bilateral legs and CATRINA with TBI, will call you with results    ILinda RN, am scribing for and in the presence of, Dr. Jaspreet Darby MD, [facility]

## 2023-11-21 ENCOUNTER — TELEPHONE (OUTPATIENT)
Dept: CARDIOLOGY | Facility: CLINIC | Age: 83
End: 2023-11-21
Payer: MEDICARE

## 2023-11-21 NOTE — TELEPHONE ENCOUNTER
SCHEDULED CTA AT [facility] ON 12/1/23 AT 1:25PM ARRIVAL TIME, I SPOKE WITH PT AND GAVE HER APPT INFO

## 2023-11-21 NOTE — TELEPHONE ENCOUNTER
PRECERT NOT REQUIRED PER [place] [employer] ONLINE FOR CT ANGIO LOWER EXTREMITIES/[id number]--MXP #[id number]

## 2023-12-01 ENCOUNTER — HOSPITAL ENCOUNTER (OUTPATIENT)
Dept: RADIOLOGY | Facility: HOSPITAL | Age: 83
Discharge: HOME | End: 2023-12-01
Payer: MEDICARE

## 2023-12-01 DIAGNOSIS — I87.2 CHRONIC VENOUS INSUFFICIENCY: ICD-10-CM

## 2023-12-01 DIAGNOSIS — M79.604 PAIN OF RIGHT LOWER EXTREMITY: ICD-10-CM

## 2023-12-01 DIAGNOSIS — R09.89 DECREASED PEDAL PULSES: ICD-10-CM

## 2023-12-01 PROCEDURE — 2550000001 HC RX 255 CONTRASTS: Performed by: INTERNAL MEDICINE

## 2023-12-01 PROCEDURE — 75635 CT ANGIO ABDOMINAL ARTERIES: CPT | Performed by: RADIOLOGY

## 2023-12-01 PROCEDURE — 75635 CT ANGIO ABDOMINAL ARTERIES: CPT

## 2023-12-01 RX ADMIN — IOHEXOL 80 ML: 350 INJECTION, SOLUTION INTRAVENOUS at 14:24

## 2023-12-12 ENCOUNTER — TELEPHONE (OUTPATIENT)
Dept: CARDIOLOGY | Facility: CLINIC | Age: 83
End: 2023-12-12
Payer: MEDICARE

## 2023-12-12 NOTE — TELEPHONE ENCOUNTER
----- Message from Jaspreet Darby MD sent at 12/4/2023  8:15 AM EST -----  CT angio demonstrates no high-grade atherosclerotic lesions of the lower extremities, there is an abnormality of the 9 vasculature I believe regarding the kidney, send this to the patient's family physician for notification and subsequent follow-up  ----- Message -----  From: Interface, Radiology Results In  Sent: 12/2/2023   1:01 PM EST  To: Jaspreet Darby MD

## 2023-12-12 NOTE — TELEPHONE ENCOUNTER
12/12  Call placed to patient and advised of test results. She verbalized understanding.  She said that at the end of October, she was admitted for issues with her kidney and at that time they mentioned doing a follow up MRI.  She has not had that done yet.  She also said that she had a fall yesterday at the store due to her legs feeling week.  She again was advised to contact Yasmine Rodriges PA-C and make appt to follow up on kidney issue and also to discuss her weakness.  Patient will follow up with her as recommended.

## 2024-03-19 DIAGNOSIS — I25.10 CAD S/P PERCUTANEOUS CORONARY ANGIOPLASTY: ICD-10-CM

## 2024-03-19 DIAGNOSIS — E78.2 MIXED HYPERLIPIDEMIA: ICD-10-CM

## 2024-03-19 DIAGNOSIS — Z98.61 CAD S/P PERCUTANEOUS CORONARY ANGIOPLASTY: ICD-10-CM

## 2024-03-19 DIAGNOSIS — I20.9 ANGINA, CLASS IV (CMS-HCC): ICD-10-CM

## 2024-03-19 DIAGNOSIS — I10 BENIGN ESSENTIAL HYPERTENSION: ICD-10-CM

## 2024-03-19 RX ORDER — METOPROLOL SUCCINATE 50 MG/1
TABLET, EXTENDED RELEASE ORAL
Qty: 135 TABLET | Refills: 3 | Status: SHIPPED | OUTPATIENT
Start: 2024-03-19

## 2024-03-19 RX ORDER — ROSUVASTATIN CALCIUM 10 MG/1
10 TABLET, COATED ORAL DAILY
Qty: 90 TABLET | Refills: 3 | Status: SHIPPED | OUTPATIENT
Start: 2024-03-19 | End: 2025-03-19

## 2024-05-30 ENCOUNTER — OFFICE VISIT (OUTPATIENT)
Dept: CARDIOLOGY | Facility: CLINIC | Age: 84
End: 2024-05-30
Payer: MEDICARE

## 2024-05-30 VITALS
SYSTOLIC BLOOD PRESSURE: 136 MMHG | DIASTOLIC BLOOD PRESSURE: 60 MMHG | HEART RATE: 70 BPM | BODY MASS INDEX: 19.24 KG/M2 | WEIGHT: 98 LBS | HEIGHT: 60 IN

## 2024-05-30 DIAGNOSIS — I25.10 CAD S/P PERCUTANEOUS CORONARY ANGIOPLASTY: ICD-10-CM

## 2024-05-30 DIAGNOSIS — J44.9 CHRONIC OBSTRUCTIVE PULMONARY DISEASE, UNSPECIFIED COPD TYPE (MULTI): ICD-10-CM

## 2024-05-30 DIAGNOSIS — F17.210 CIGARETTE SMOKER: ICD-10-CM

## 2024-05-30 DIAGNOSIS — I47.10 PAROXYSMAL SVT (SUPRAVENTRICULAR TACHYCARDIA) (CMS-HCC): ICD-10-CM

## 2024-05-30 DIAGNOSIS — Z01.818 PREOPERATIVE CLEARANCE: ICD-10-CM

## 2024-05-30 DIAGNOSIS — Z98.61 CAD S/P PERCUTANEOUS CORONARY ANGIOPLASTY: ICD-10-CM

## 2024-05-30 DIAGNOSIS — I10 BENIGN ESSENTIAL HYPERTENSION: ICD-10-CM

## 2024-05-30 DIAGNOSIS — I25.2 HISTORY OF MI (MYOCARDIAL INFARCTION): ICD-10-CM

## 2024-05-30 DIAGNOSIS — E78.2 MIXED HYPERLIPIDEMIA: ICD-10-CM

## 2024-05-30 DIAGNOSIS — Z01.818 PRE-OP EXAM: ICD-10-CM

## 2024-05-30 DIAGNOSIS — I87.2 CHRONIC VENOUS INSUFFICIENCY: ICD-10-CM

## 2024-05-30 DIAGNOSIS — I89.0 LYMPHEDEMA: ICD-10-CM

## 2024-05-30 PROCEDURE — 99214 OFFICE O/P EST MOD 30 MIN: CPT | Performed by: INTERNAL MEDICINE

## 2024-05-30 PROCEDURE — 3075F SYST BP GE 130 - 139MM HG: CPT | Performed by: INTERNAL MEDICINE

## 2024-05-30 PROCEDURE — 3078F DIAST BP <80 MM HG: CPT | Performed by: INTERNAL MEDICINE

## 2024-05-30 PROCEDURE — 93000 ELECTROCARDIOGRAM COMPLETE: CPT | Performed by: INTERNAL MEDICINE

## 2024-05-30 PROCEDURE — 1159F MED LIST DOCD IN RCRD: CPT | Performed by: INTERNAL MEDICINE

## 2024-05-30 RX ORDER — NITROGLYCERIN 0.4 MG/1
TABLET SUBLINGUAL
Qty: 25 TABLET | Refills: 1 | Status: SHIPPED | OUTPATIENT
Start: 2024-05-30

## 2024-05-30 NOTE — PROGRESS NOTES
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  She states that she feels she is doing well cardiac wise.  She has had a couple episodes of what she thinks is heartburn.  These are infrequent.  She usually takes some GI medication with relief.  On 1 occasion she did take 1 nitroglycerin which subsequently relieved and after the GI medicines had not helped her.  She denies any problem with dyspnea.  She denies pretibial edema.  She denies palpitations or feeling of any heart racing.  She denies presyncope and syncope.    EKG: Normal sinus rhythm, remote anteroseptal infarction, no change from EKG of January 2024    Coronary artery disease  Remote non st segment elevation myocardial infarction  Remote PCI  Mixed hyperlipidemia  Tobacco abuse  COPD  History of PSVT  History of chronic insufficiency and lymphedema of lower extremity  9.   Preoperative Risk Stratification    Will update Echocardiogram preoperatively prior to planned Kidney removal procedure with Dr. Cortney MD in June. Assuming Echo and preoperative lab work are acceptable, patient will be an acceptable cardiovascular risk.    Please excuse any errors in grammar or translation related to this dictation.  Voice recognition software was utilized to prepare this document.      Past Medical History      Social History  Social History     Tobacco Use    Smoking status: Every Day     Types: Cigarettes    Smokeless tobacco: Never   Substance Use Topics    Alcohol use: Yes     Comment: rare    Drug use: Never       Family History     Family History   Problem Relation Name Age of Onset    Cancer Mother      Other (cardiac disorder) Father      Stroke Father          CVA    Hypertension Father      Other (coagulation disorder) Father          Allergies:  Allergies   Allergen Reactions     Cyclobenzaprine Unknown    Meperidine Unknown    Tolmetin Other     Abdominal pain        Outpatient Medications:  Current Outpatient Medications   Medication Instructions    albuterol 90 mcg/actuation inhaler 2 puffs, miscellaneous, Every 4 hours PRN    aspirin 81 mg, oral, Once    gabapentin (NEURONTIN) 300 mg, oral, Daily PRN    metoprolol succinate XL (Toprol-XL) 50 mg 24 hr tablet TAKE 1 TABLET in the morning and 1/2 tablet in the evening    nitroglycerin (Nitrostat) 0.4 mg SL tablet PLACE 1 TABLET UNDER THE TONGUE EVERY 5 MINUTES UP TO 3 DOSES AS NEEDED FOR CHEST PAIN.    rosuvastatin (CRESTOR) 10 mg, oral, Daily    traZODone (Desyrel) 50 mg tablet 1 tablet, oral, Daily          REVIEW OF SYSTEMS  Review of Systems   All other systems reviewed and are negative.        VITALS  Vitals:    05/30/24 1356   BP: 136/60   Pulse: 70       PHYSICAL EXAM  Cardiovascular:      PMI at left midclavicular line. Normal rate. Regular rhythm. Normal S1. Normal S2.       Murmurs: There is no murmur.      No gallop.  No click. No rub.   Pulses:     Intact distal pulses.   Edema:     Peripheral edema absent.           ASSESSMENT AND PLAN  Diagnoses and all orders for this visit:  Pre-op exam  CAD S/P percutaneous coronary angioplasty  History of MI (myocardial infarction)  Mixed hyperlipidemia  Chronic obstructive pulmonary disease, unspecified COPD type (Multi)  Paroxysmal SVT (supraventricular tachycardia) (CMS-[facility])  Chronic venous insufficiency  Benign essential hypertension  Lymphedema  Preoperative clearance  Body mass index (BMI) 19.9 or less, adult  Cigarette smoker      [unfilled]

## 2024-05-30 NOTE — PATIENT INSTRUCTIONS
Patient to follow up in 3 months with Dr. Jaspreet Weldon MD      Office will arrange Echo in near future for preoperative clearance prior to kidney removal procedure with Dr. Cortney MD in June.   Will call you with results.     No other changes today.   Continue same medications and treatments.   Patient educated on proper medication use.   Patient educated on risk factor modification.   Please bring any lab results from other providers / physicians to your next appointment.     Please bring all medicines, vitamins, and herbal supplements with you when you come to the office.     Prescriptions will not be filled unless you are compliant with your follow up appointments or have a follow up appointment scheduled as per instruction of your physician. Refills should be requested at the time of your visit.    ITyler RN am scribing for and in the presence of Dr. Jaspreet Darby MD

## 2024-06-05 ENCOUNTER — HOSPITAL ENCOUNTER (OUTPATIENT)
Dept: CARDIOLOGY | Facility: CLINIC | Age: 84
Discharge: HOME | End: 2024-06-05
Payer: MEDICARE

## 2024-06-05 DIAGNOSIS — I25.10 CAD S/P PERCUTANEOUS CORONARY ANGIOPLASTY: ICD-10-CM

## 2024-06-05 DIAGNOSIS — Z01.818 PRE-OP EXAM: ICD-10-CM

## 2024-06-05 DIAGNOSIS — Z98.61 CAD S/P PERCUTANEOUS CORONARY ANGIOPLASTY: ICD-10-CM

## 2024-06-05 DIAGNOSIS — I10 BENIGN ESSENTIAL HYPERTENSION: ICD-10-CM

## 2024-06-06 ENCOUNTER — HOSPITAL ENCOUNTER (OUTPATIENT)
Dept: CARDIOLOGY | Facility: CLINIC | Age: 84
Discharge: HOME | End: 2024-06-06
Payer: MEDICARE

## 2024-06-06 LAB
AORTIC VALVE MEAN GRADIENT: 4 MMHG
AORTIC VALVE PEAK VELOCITY: 1.25 M/S
AV PEAK GRADIENT: 6.3 MMHG
AVA (PEAK VEL): 2.29 CM2
AVA (VTI): 1.92 CM2
EJECTION FRACTION APICAL 4 CHAMBER: 74.1
LEFT VENTRICLE INTERNAL DIMENSION DIASTOLE: 3.53 CM (ref 3.5–6)
LEFT VENTRICULAR OUTFLOW TRACT DIAMETER: 2 CM
MITRAL VALVE E/A RATIO: 0.77

## 2024-06-06 PROCEDURE — 93306 TTE W/DOPPLER COMPLETE: CPT | Performed by: INTERNAL MEDICINE

## 2024-06-06 PROCEDURE — 93306 TTE W/DOPPLER COMPLETE: CPT

## 2024-06-07 ENCOUNTER — TELEPHONE (OUTPATIENT)
Dept: CARDIOLOGY | Facility: CLINIC | Age: 84
End: 2024-06-07
Payer: MEDICARE

## 2024-06-07 NOTE — TELEPHONE ENCOUNTER
Call placed to patient and advised of Echo results and that she is an acceptable cardiac risk for upcoming surgery.  Patient verbalized understanding.

## 2024-06-07 NOTE — TELEPHONE ENCOUNTER
----- Message from Jaspreet Darby MD sent at 6/6/2024  3:51 PM EDT -----  Echo demonstrates normal left ventricular systolic function, no significant valvular abnormalities, I believe she is having this done for preoperative evaluation, if that is the case she is okay to proceed with her procedure  ----- Message -----  From: Interface, [facility] - Cardiology Results In  Sent: 6/6/2024   2:46 PM EDT  To: Jaspreet Darby MD

## 2024-08-12 ENCOUNTER — OFFICE VISIT (OUTPATIENT)
Dept: GERIATRIC MEDICINE | Age: 84
End: 2024-08-12
Payer: MEDICARE

## 2024-08-12 DIAGNOSIS — E78.5 HYPERLIPIDEMIA, UNSPECIFIED HYPERLIPIDEMIA TYPE: ICD-10-CM

## 2024-08-12 DIAGNOSIS — I10 HYPERTENSION, UNSPECIFIED TYPE: ICD-10-CM

## 2024-08-12 DIAGNOSIS — E43 SEVERE PROTEIN-CALORIE MALNUTRITION (GOMEZ: LESS THAN 60% OF STANDARD WEIGHT) (HCC): ICD-10-CM

## 2024-08-12 DIAGNOSIS — J44.9 CHRONIC OBSTRUCTIVE PULMONARY DISEASE, UNSPECIFIED COPD TYPE (HCC): ICD-10-CM

## 2024-08-12 DIAGNOSIS — Z47.89 ORTHOPEDIC AFTERCARE: Primary | ICD-10-CM

## 2024-08-12 PROCEDURE — 99306 1ST NF CARE HIGH MDM 50: CPT | Performed by: INTERNAL MEDICINE

## 2024-08-12 PROCEDURE — 1123F ACP DISCUSS/DSCN MKR DOCD: CPT | Performed by: INTERNAL MEDICINE

## 2024-08-14 ENCOUNTER — OFFICE VISIT (OUTPATIENT)
Dept: GERIATRIC MEDICINE | Age: 84
End: 2024-08-14

## 2024-08-14 DIAGNOSIS — J44.9 CHRONIC OBSTRUCTIVE PULMONARY DISEASE, UNSPECIFIED COPD TYPE (HCC): ICD-10-CM

## 2024-08-14 DIAGNOSIS — I25.10 CORONARY ARTERY DISEASE WITHOUT ANGINA PECTORIS, UNSPECIFIED VESSEL OR LESION TYPE, UNSPECIFIED WHETHER NATIVE OR TRANSPLANTED HEART: ICD-10-CM

## 2024-08-14 DIAGNOSIS — E43 SEVERE PROTEIN-CALORIE MALNUTRITION (GOMEZ: LESS THAN 60% OF STANDARD WEIGHT) (HCC): ICD-10-CM

## 2024-08-14 DIAGNOSIS — Z47.89 ORTHOPEDIC AFTERCARE: Primary | ICD-10-CM

## 2024-08-14 DIAGNOSIS — E78.5 HYPERLIPIDEMIA, UNSPECIFIED HYPERLIPIDEMIA TYPE: ICD-10-CM

## 2024-08-14 DIAGNOSIS — I10 HYPERTENSION, UNSPECIFIED TYPE: ICD-10-CM

## 2024-08-15 ENCOUNTER — OFFICE VISIT (OUTPATIENT)
Dept: GERIATRIC MEDICINE | Age: 84
End: 2024-08-15

## 2024-08-15 DIAGNOSIS — Z47.89 ORTHOPEDIC AFTERCARE: Primary | ICD-10-CM

## 2024-08-15 DIAGNOSIS — E78.5 HYPERLIPIDEMIA, UNSPECIFIED HYPERLIPIDEMIA TYPE: ICD-10-CM

## 2024-08-15 DIAGNOSIS — I25.10 CORONARY ARTERY DISEASE WITHOUT ANGINA PECTORIS, UNSPECIFIED VESSEL OR LESION TYPE, UNSPECIFIED WHETHER NATIVE OR TRANSPLANTED HEART: ICD-10-CM

## 2024-08-15 DIAGNOSIS — J44.9 CHRONIC OBSTRUCTIVE PULMONARY DISEASE, UNSPECIFIED COPD TYPE (HCC): ICD-10-CM

## 2024-08-15 DIAGNOSIS — E43 SEVERE PROTEIN-CALORIE MALNUTRITION (GOMEZ: LESS THAN 60% OF STANDARD WEIGHT) (HCC): ICD-10-CM

## 2024-08-15 DIAGNOSIS — I10 HYPERTENSION, UNSPECIFIED TYPE: ICD-10-CM

## 2024-08-16 ENCOUNTER — OFFICE VISIT (OUTPATIENT)
Dept: GERIATRIC MEDICINE | Age: 84
End: 2024-08-16

## 2024-08-16 DIAGNOSIS — E43 SEVERE PROTEIN-CALORIE MALNUTRITION (GOMEZ: LESS THAN 60% OF STANDARD WEIGHT) (HCC): ICD-10-CM

## 2024-08-16 DIAGNOSIS — I10 HYPERTENSION, UNSPECIFIED TYPE: ICD-10-CM

## 2024-08-16 DIAGNOSIS — J44.9 CHRONIC OBSTRUCTIVE PULMONARY DISEASE, UNSPECIFIED COPD TYPE (HCC): ICD-10-CM

## 2024-08-16 DIAGNOSIS — E78.5 HYPERLIPIDEMIA, UNSPECIFIED HYPERLIPIDEMIA TYPE: ICD-10-CM

## 2024-08-16 DIAGNOSIS — I25.10 CORONARY ARTERY DISEASE WITHOUT ANGINA PECTORIS, UNSPECIFIED VESSEL OR LESION TYPE, UNSPECIFIED WHETHER NATIVE OR TRANSPLANTED HEART: ICD-10-CM

## 2024-08-16 DIAGNOSIS — Z47.89 ORTHOPEDIC AFTERCARE: Primary | ICD-10-CM

## 2024-08-19 ENCOUNTER — OFFICE VISIT (OUTPATIENT)
Dept: GERIATRIC MEDICINE | Age: 84
End: 2024-08-19

## 2024-08-19 DIAGNOSIS — E43 SEVERE PROTEIN-CALORIE MALNUTRITION (GOMEZ: LESS THAN 60% OF STANDARD WEIGHT) (HCC): ICD-10-CM

## 2024-08-19 DIAGNOSIS — E78.5 HYPERLIPIDEMIA, UNSPECIFIED HYPERLIPIDEMIA TYPE: ICD-10-CM

## 2024-08-19 DIAGNOSIS — Z47.89 ORTHOPEDIC AFTERCARE: Primary | ICD-10-CM

## 2024-08-19 DIAGNOSIS — I10 HYPERTENSION, UNSPECIFIED TYPE: ICD-10-CM

## 2024-08-19 DIAGNOSIS — J44.9 CHRONIC OBSTRUCTIVE PULMONARY DISEASE, UNSPECIFIED COPD TYPE (HCC): ICD-10-CM

## 2024-08-19 DIAGNOSIS — I25.10 CORONARY ARTERY DISEASE WITHOUT ANGINA PECTORIS, UNSPECIFIED VESSEL OR LESION TYPE, UNSPECIFIED WHETHER NATIVE OR TRANSPLANTED HEART: ICD-10-CM

## 2024-08-19 NOTE — PROGRESS NOTES
Supplements  HLD  Statin           WEN Pelayo DO     Electronically signed 8/19/2024        NOTE: This report was transcribed using voice recognition software. Every effort was made to ensure accuracy; however, inadvertent computerized transcription errors may be present.

## 2024-08-20 ENCOUNTER — OFFICE VISIT (OUTPATIENT)
Dept: GERIATRIC MEDICINE | Age: 84
End: 2024-08-20

## 2024-08-20 DIAGNOSIS — E78.5 HYPERLIPIDEMIA, UNSPECIFIED HYPERLIPIDEMIA TYPE: ICD-10-CM

## 2024-08-20 DIAGNOSIS — Z47.89 ORTHOPEDIC AFTERCARE: Primary | ICD-10-CM

## 2024-08-20 DIAGNOSIS — J44.9 CHRONIC OBSTRUCTIVE PULMONARY DISEASE, UNSPECIFIED COPD TYPE (HCC): ICD-10-CM

## 2024-08-20 DIAGNOSIS — I10 HYPERTENSION, UNSPECIFIED TYPE: ICD-10-CM

## 2024-08-20 DIAGNOSIS — I25.10 CORONARY ARTERY DISEASE WITHOUT ANGINA PECTORIS, UNSPECIFIED VESSEL OR LESION TYPE, UNSPECIFIED WHETHER NATIVE OR TRANSPLANTED HEART: ICD-10-CM

## 2024-08-20 DIAGNOSIS — E43 SEVERE PROTEIN-CALORIE MALNUTRITION (GOMEZ: LESS THAN 60% OF STANDARD WEIGHT) (HCC): ICD-10-CM

## 2024-08-21 NOTE — PROGRESS NOTES
SNF PROGRESS NOTE      Cc- fall/ left ankle fracture.       Patient is a Harriett Segovia 84 y.o. female  s/p recent hospital stay for a fall. She underwent a radical Right nephrectomy on 7/3/24. Post fall she was noted to have a trimalleolar fracture. She had an ORIF.     Patient is sitting in her room. Her pain is controlled with current regimen. The patient is eating well.         Past Medical History:   Diagnosis Date    Abnormal Pap smear of cervix     C. difficile colitis     CAD (coronary artery disease)     Hyperlipidemia     Hypertension     ITP secondary to infection     Osteoporosis 2018     Zithromax [azithromycin], Diphenhydramine, Flexeril [cyclobenzaprine hcl], Meperidine, and Tolmetin    VS reviewed    Gen- Alert and oriented x 3   Heart- RRR no murmur no LE edema   Lungs- CTA b/l no resp distress RA  oxygen   Abd- bs x 4           Assessment and Plan    Left trimalleolar Fracture s/p ORIF   Morphine sulfate   PRN oxy   Recent Right radial nephrectomy 7/3/24  Morphine sulfate   PRN oxy   COPD  Spiriva   HTN   Toperol XL  Lasix   Severe protein calorie malnutrition   Supplements  HLD  Statin       WEN Pelayo DO     Electronically signed by: Terri Polk DO on 8/14/2024

## 2024-08-22 ENCOUNTER — OFFICE VISIT (OUTPATIENT)
Dept: GERIATRIC MEDICINE | Age: 84
End: 2024-08-22
Payer: MEDICARE

## 2024-08-22 DIAGNOSIS — E43 SEVERE PROTEIN-CALORIE MALNUTRITION (GOMEZ: LESS THAN 60% OF STANDARD WEIGHT) (HCC): ICD-10-CM

## 2024-08-22 DIAGNOSIS — I25.10 CORONARY ARTERY DISEASE WITHOUT ANGINA PECTORIS, UNSPECIFIED VESSEL OR LESION TYPE, UNSPECIFIED WHETHER NATIVE OR TRANSPLANTED HEART: ICD-10-CM

## 2024-08-22 DIAGNOSIS — E78.5 HYPERLIPIDEMIA, UNSPECIFIED HYPERLIPIDEMIA TYPE: ICD-10-CM

## 2024-08-22 DIAGNOSIS — Z47.89 ORTHOPEDIC AFTERCARE: Primary | ICD-10-CM

## 2024-08-22 DIAGNOSIS — I10 HYPERTENSION, UNSPECIFIED TYPE: ICD-10-CM

## 2024-08-22 DIAGNOSIS — J44.9 CHRONIC OBSTRUCTIVE PULMONARY DISEASE, UNSPECIFIED COPD TYPE (HCC): ICD-10-CM

## 2024-08-22 PROCEDURE — 99308 SBSQ NF CARE LOW MDM 20: CPT | Performed by: INTERNAL MEDICINE

## 2024-08-22 PROCEDURE — 1123F ACP DISCUSS/DSCN MKR DOCD: CPT | Performed by: INTERNAL MEDICINE

## 2024-08-22 NOTE — PROGRESS NOTES
SNF PROGRESS NOTE      Cc- fall/ left ankle fracture.       Patient is a Harriett Segovia 84 y.o. female   s/p recent hospital stay for a fall. She underwent a radical Right nephrectomy on 7/3/24. Post fall she was noted to have a trimalleolar fracture. She had an ORIF.     Patient is eating well. Her pain is controlled. She continues to work with therapy.         Past Medical History:   Diagnosis Date    Abnormal Pap smear of cervix     C. difficile colitis     CAD (coronary artery disease)     Hyperlipidemia     Hypertension     ITP secondary to infection     Osteoporosis 2018     Zithromax [azithromycin], Diphenhydramine, Flexeril [cyclobenzaprine hcl], Meperidine, and Tolmetin    VS reviewed    Gen- Alert and oriented x 3   Heart- RRR no murmur no LE edema   Lungs- CTA b/l no resp distress RA  oxygen   Abd- bs x 4           Assessment and Plan    Left trimalleolar Fracture s/p ORIF   Morphine sulfate   PRN oxy   Recent Right radial nephrectomy 7/3/24  Morphine sulfate   PRN oxy   COPD  Spiriva   HTN   Toperol XL  Lasix   Severe protein calorie malnutrition   Supplements  HLD  Statin          Terri Polk DO, FACSTONEY     Electronically signed by: Terri Polk DO on 8/15/2024

## 2024-08-23 ENCOUNTER — OFFICE VISIT (OUTPATIENT)
Dept: GERIATRIC MEDICINE | Age: 84
End: 2024-08-23

## 2024-08-23 DIAGNOSIS — E43 SEVERE PROTEIN-CALORIE MALNUTRITION (GOMEZ: LESS THAN 60% OF STANDARD WEIGHT) (HCC): ICD-10-CM

## 2024-08-23 DIAGNOSIS — R52 PAIN: Primary | ICD-10-CM

## 2024-08-23 DIAGNOSIS — J44.9 CHRONIC OBSTRUCTIVE PULMONARY DISEASE, UNSPECIFIED COPD TYPE (HCC): ICD-10-CM

## 2024-08-23 DIAGNOSIS — E78.5 HYPERLIPIDEMIA, UNSPECIFIED HYPERLIPIDEMIA TYPE: ICD-10-CM

## 2024-08-23 DIAGNOSIS — I10 HYPERTENSION, UNSPECIFIED TYPE: ICD-10-CM

## 2024-08-23 DIAGNOSIS — Z47.89 ORTHOPEDIC AFTERCARE: ICD-10-CM

## 2024-08-23 RX ORDER — OXYCODONE HYDROCHLORIDE 5 MG/1
10 TABLET ORAL EVERY 4 HOURS PRN
Qty: 50 TABLET | Refills: 0 | Status: SHIPPED | OUTPATIENT
Start: 2024-08-23 | End: 2024-08-28

## 2024-08-26 NOTE — PROGRESS NOTES
SNF PROGRESS NOTE      Cc- fall/ left ankle fracture.       Patient is a Harriett Segovia 84 y.o. female s/p recent hospital stay for a fall. She underwent a radical Right nephrectomy on 7/3/24. Post fall she was noted to have a trimalleolar fracture. She had an ORIF.     Patient is doing really well with therapy. She is eating well. Her pain is controlled.         Past Medical History:   Diagnosis Date    Abnormal Pap smear of cervix     C. difficile colitis     CAD (coronary artery disease)     Hyperlipidemia     Hypertension     ITP secondary to infection     Osteoporosis 2018     Zithromax [azithromycin], Diphenhydramine, Flexeril [cyclobenzaprine hcl], Meperidine, and Tolmetin    VS reviewed    Gen- Alert and oriented x 3   Heart- RRR no murmur no LE edema   Lungs- CTA b/l no resp distress RA  oxygen   Abd- bs x 4           Assessment and Plan    Left trimalleolar Fracture s/p ORIF   Morphine sulfate   PRN oxy   Recent Right radial nephrectomy 7/3/24  Morphine sulfate   PRN oxy   COPD  Spiriva   HTN   Toperol XL  Lasix   Severe protein calorie malnutrition   Supplements  HLD  Statin      Discharge planning for 23rd.         WEN Pelayo DO     Electronically signed by: Terri Polk DO on 8/22/2024

## 2024-08-30 ENCOUNTER — APPOINTMENT (OUTPATIENT)
Dept: CARDIOLOGY | Facility: CLINIC | Age: 84
End: 2024-08-30
Payer: MEDICARE

## 2024-09-04 ENCOUNTER — APPOINTMENT (OUTPATIENT)
Dept: CARDIOLOGY | Facility: CLINIC | Age: 84
End: 2024-09-04
Payer: MEDICARE

## 2024-10-02 ENCOUNTER — APPOINTMENT (OUTPATIENT)
Dept: CARDIOLOGY | Facility: CLINIC | Age: 84
End: 2024-10-02
Payer: MEDICARE

## 2024-10-02 VITALS — SYSTOLIC BLOOD PRESSURE: 122 MMHG | HEART RATE: 64 BPM | DIASTOLIC BLOOD PRESSURE: 58 MMHG

## 2024-10-02 DIAGNOSIS — I89.0 LYMPHEDEMA: ICD-10-CM

## 2024-10-02 DIAGNOSIS — J44.9 CHRONIC OBSTRUCTIVE PULMONARY DISEASE, UNSPECIFIED COPD TYPE (MULTI): ICD-10-CM

## 2024-10-02 DIAGNOSIS — I10 BENIGN ESSENTIAL HYPERTENSION: ICD-10-CM

## 2024-10-02 DIAGNOSIS — I25.10 CAD S/P PERCUTANEOUS CORONARY ANGIOPLASTY: ICD-10-CM

## 2024-10-02 DIAGNOSIS — Z90.5 H/O RADICAL NEPHRECTOMY: ICD-10-CM

## 2024-10-02 DIAGNOSIS — I47.10 PAROXYSMAL SVT (SUPRAVENTRICULAR TACHYCARDIA) (CMS-HCC): ICD-10-CM

## 2024-10-02 DIAGNOSIS — I87.2 CHRONIC VENOUS INSUFFICIENCY: ICD-10-CM

## 2024-10-02 DIAGNOSIS — F17.210 CIGARETTE SMOKER: ICD-10-CM

## 2024-10-02 DIAGNOSIS — I25.2 HISTORY OF MI (MYOCARDIAL INFARCTION): ICD-10-CM

## 2024-10-02 DIAGNOSIS — Z98.61 CAD S/P PERCUTANEOUS CORONARY ANGIOPLASTY: ICD-10-CM

## 2024-10-02 DIAGNOSIS — E78.2 MIXED HYPERLIPIDEMIA: ICD-10-CM

## 2024-10-02 PROCEDURE — 99214 OFFICE O/P EST MOD 30 MIN: CPT | Performed by: INTERNAL MEDICINE

## 2024-10-02 PROCEDURE — 3078F DIAST BP <80 MM HG: CPT | Performed by: INTERNAL MEDICINE

## 2024-10-02 PROCEDURE — 1159F MED LIST DOCD IN RCRD: CPT | Performed by: INTERNAL MEDICINE

## 2024-10-02 PROCEDURE — 3074F SYST BP LT 130 MM HG: CPT | Performed by: INTERNAL MEDICINE

## 2024-10-02 RX ORDER — APIXABAN 5 MG/1
5 TABLET, FILM COATED ORAL 2 TIMES DAILY
COMMUNITY
Start: 2024-09-07

## 2024-10-02 NOTE — PATIENT INSTRUCTIONS
Patient to follow up in 6 months with Dr. Jaspreet Weldon MD      Please STOP Aspirin moving forward.     No other changes today.   Continue same medications and treatments.   Patient educated on proper medication use.   Patient educated on risk factor modification.   Please bring any lab results from other providers / physicians to your next appointment.     Please bring all medicines, vitamins, and herbal supplements with you when you come to the office.     Prescriptions will not be filled unless you are compliant with your follow up appointments or have a follow up appointment scheduled as per instruction of your physician. Refills should be requested at the time of your visit.    ITyler RN am scribing for and in the presence of Dr. Jaspreet Darby MD

## 2024-10-02 NOTE — PROGRESS NOTES
CARDIOLOGY OFFICE VISIT      CHIEF COMPLAINT      HISTORY OF PRESENT ILLNESS  The patient states that recently she had right nephrectomy for cancer.  She did not have any cardiac issues at that time.  She states that she has subsignificant found to have a pulmonary embolus which apparently occurred sometime in the postoperative state.  She denies any significant dyspnea.  She has not used nitroglycerin.  She denies prolonged palpitations, presyncope or syncope.  She states she is now undergoing treatment because of significant spread of her cancer everywhere.  She could not give me any further details.  She states that they have stopped her aspirin her metoprolol and her Crestor.    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  Voice recognition software was utilized to prepare this document.    Past Medical History  Past Medical History:   Diagnosis Date    Atherosclerotic heart disease of native coronary artery without angina pectoris     Coronary artery disease    Effusion, unspecified ankle     Ankle swelling    Other chronic pain     Chronic pain    Other specified soft tissue disorders     Leg swelling    Personal history of other diseases of the circulatory system     History of class IV angina pectoris    Personal history of other diseases of the digestive system     History of pancreatitis    Personal history of other diseases of the musculoskeletal system and connective tissue     History of arthritis    Personal history of other diseases of the musculoskeletal system and connective tissue     History of backache     Personal history of other diseases of the nervous system and sense organs     History of migraine    Personal history of other diseases of the respiratory system     History of asthma    Personal history of other specified conditions     History of chest pain    Pure hypercholesterolemia, unspecified     High cholesterol    Spondylopathy, unspecified     Spinal disease    Underweight     Underweight       Social History  Social History     Tobacco Use    Smoking status: Every Day     Current packs/day: 0.50     Types: Cigarettes    Smokeless tobacco: Never   Substance Use Topics    Alcohol use: Yes     Comment: rare    Drug use: Never       Family History     Family History   Problem Relation Name Age of Onset    Cancer Mother      Other (cardiac disorder) Father      Stroke Father          CVA    Hypertension Father      Other (coagulation disorder) Father          Allergies:  Allergies   Allergen Reactions    Cyclobenzaprine Unknown    Meperidine Unknown    Tolmetin Other     Abdominal pain        Outpatient Medications:  Current Outpatient Medications   Medication Instructions    albuterol 90 mcg/actuation inhaler 2 puffs, miscellaneous, Every 4 hours PRN    aspirin 81 mg, oral, Once    Eliquis 5 mg, oral, 2 times daily    gabapentin (NEURONTIN) 300 mg, oral, Daily PRN    metoprolol succinate XL (Toprol-XL) 50 mg 24 hr tablet TAKE 1 TABLET in the morning and 1/2 tablet in the evening    nitroglycerin (Nitrostat) 0.4 mg SL tablet PLACE 1 TABLET UNDER THE TONGUE EVERY 5 MINUTES UP TO 3 DOSES AS NEEDED FOR CHEST PAIN.    rosuvastatin (CRESTOR) 10 mg, oral, Daily    traZODone (Desyrel) 50 mg tablet 1 tablet, oral, Daily          REVIEW OF SYSTEMS  Review of Systems   Constitutional:        Wheelchair for assistance    All other systems reviewed and are negative.        VITALS  Vitals:    10/02/24 1532   BP: 122/58   Pulse: 64       PHYSICAL EXAM  Vitals reviewed.   Constitutional:       Appearance: Normal and  healthy appearance. Well-developed and not in distress.   Eyes:      Conjunctiva/sclera: Conjunctivae normal.      Pupils: Pupils are equal, round, and reactive to light.   Neck:      Vascular: No JVR. JVD normal.   Pulmonary:      Effort: Pulmonary effort is normal.      Breath sounds: Normal breath sounds. No wheezing. No rhonchi. No rales.   Chest:      Chest wall: Not tender to palpatation.   Cardiovascular:      PMI at left midclavicular line. Normal rate. Regular rhythm. Normal S1. Normal S2.       Murmurs: There is no murmur.      No gallop.  No click. No rub.   Pulses:     Intact distal pulses.   Edema:     Peripheral edema absent.   Abdominal:      Tenderness: There is no abdominal tenderness.   Musculoskeletal: Normal range of motion.         General: No tenderness.      Cervical back: Normal range of motion. Skin:     General: Skin is warm and dry.   Neurological:      General: No focal deficit present.      Mental Status: Alert and oriented to person, place and time.   Psychiatric:         Behavior: Behavior is cooperative.           ASSESSMENT AND PLAN  Diagnoses and all orders for this visit:  CAD S/P percutaneous coronary angioplasty  History of MI (myocardial infarction)  Mixed hyperlipidemia  Chronic obstructive pulmonary disease, unspecified COPD type (Multi)  Paroxysmal SVT (supraventricular tachycardia) (CMS-HCC)  Chronic venous insufficiency  Benign essential hypertension  Cigarette smoker  Body mass index (BMI) 19.9 or less, adult  Lymphedema  H/O radical nephrectomy      [unfilled]

## 2024-11-20 ENCOUNTER — APPOINTMENT (OUTPATIENT)
Dept: CARDIOLOGY | Facility: CLINIC | Age: 84
End: 2024-11-20
Payer: MEDICARE

## 2025-02-27 ENCOUNTER — OFFICE VISIT (OUTPATIENT)
Dept: URGENT CARE | Age: 85
End: 2025-02-27
Payer: MEDICARE

## 2025-02-27 ENCOUNTER — ANCILLARY PROCEDURE (OUTPATIENT)
Dept: URGENT CARE | Age: 85
End: 2025-02-27
Payer: MEDICARE

## 2025-02-27 VITALS
BODY MASS INDEX: 16.88 KG/M2 | OXYGEN SATURATION: 98 % | TEMPERATURE: 97.3 F | RESPIRATION RATE: 20 BRPM | HEART RATE: 87 BPM | DIASTOLIC BLOOD PRESSURE: 69 MMHG | HEIGHT: 60 IN | SYSTOLIC BLOOD PRESSURE: 117 MMHG | WEIGHT: 86 LBS

## 2025-02-27 DIAGNOSIS — M25.572 ACUTE LEFT ANKLE PAIN: ICD-10-CM

## 2025-02-27 DIAGNOSIS — M25.572 ACUTE LEFT ANKLE PAIN: Primary | ICD-10-CM

## 2025-02-27 PROCEDURE — 73610 X-RAY EXAM OF ANKLE: CPT | Mod: LEFT SIDE | Performed by: NURSE PRACTITIONER

## 2025-02-27 ASSESSMENT — ENCOUNTER SYMPTOMS
ENDOCRINE NEGATIVE: 1
GASTROINTESTINAL NEGATIVE: 1
PALPITATIONS: 0
MUSCULOSKELETAL NEGATIVE: 1
HEMATOLOGIC/LYMPHATIC NEGATIVE: 1
LEG PAIN: 1
PSYCHIATRIC NEGATIVE: 1
ALLERGIC/IMMUNOLOGIC NEGATIVE: 1
CONSTITUTIONAL NEGATIVE: 1
RESPIRATORY NEGATIVE: 1
EYES NEGATIVE: 1
NEUROLOGICAL NEGATIVE: 1

## 2025-02-27 NOTE — PROGRESS NOTES
Subjective   Patient ID: Rosenda Lambert is a 84 y.o. female. They present today with a chief complaint of Leg Pain (Left leg pain./Pt states she had surgery 8 months ago for broken bone and 1 suture has not dissolved. ).    History of Present Illness    Leg Pain         Pt presents to urgent care with c/o  L ankle pain.  Pt states she had surgery for broken ankle 8 months ago.  States in the past few days she has noticed a stitch coming out of her incision and it is very painful.  She denies any new injuries, falls, etc.  She denies redness or drainage .  Pt denies CP, SOB, palpitations, fevers, abd pain, n/v/d, sick contacts, recent travel.        Past Medical History  Allergies as of 02/27/2025 - Reviewed 02/27/2025   Allergen Reaction Noted    Cyclobenzaprine Unknown 10/23/2023    Meperidine Unknown 10/23/2023    Tolmetin Other 10/23/2023       (Not in a hospital admission)       Past Medical History:   Diagnosis Date    Atherosclerotic heart disease of native coronary artery without angina pectoris     Coronary artery disease    Effusion, unspecified ankle     Ankle swelling    Other chronic pain     Chronic pain    Other specified soft tissue disorders     Leg swelling    Personal history of other diseases of the circulatory system     History of class IV angina pectoris    Personal history of other diseases of the digestive system     History of pancreatitis    Personal history of other diseases of the musculoskeletal system and connective tissue     History of arthritis    Personal history of other diseases of the musculoskeletal system and connective tissue     History of backache    Personal history of other diseases of the nervous system and sense organs     History of migraine    Personal history of other diseases of the respiratory system     History of asthma    Personal history of other specified conditions     History of chest pain    Pure hypercholesterolemia, unspecified     High cholesterol     Spondylopathy, unspecified     Spinal disease    Underweight     Underweight       Past Surgical History:   Procedure Laterality Date    CT ANGIO AORTA AND BILATERAL ILIOFEMORAL RUNOFF W AND OR WO IV CONTRAST  12/01/2023    CT AORTA AND BILATERAL ILIOFEMORAL RUNOFF ANGIOGRAM W AND/OR WO IV CONTRAST 12/1/2023 [facility]    FOOT FRACTURE SURGERY Left 2024    HYSTERECTOMY  05/16/2017    Hysterectomy    NEPHRECTOMY Right 2024    OTHER SURGICAL HISTORY  05/16/2017    Pancreatic Surgery    OTHER SURGICAL HISTORY  05/16/2017    Wrist Surgery    OTHER SURGICAL HISTORY  04/11/2022    Cardiac catheterization with stent placement    OTHER SURGICAL HISTORY  04/11/2022    Colonoscopy    OTHER SURGICAL HISTORY  04/11/2022    Ventral hernia repair    OTHER SURGICAL HISTORY  04/11/2022    Shoulder surgery    OTHER SURGICAL HISTORY  04/11/2022    Percutaneous transluminal coronary angioplasty    OTHER SURGICAL HISTORY  04/11/2022    Esophagogastroduodenoscopy    OTHER SURGICAL HISTORY  04/11/2022    Spinal surgery        reports that she has been smoking cigarettes. She has never used smokeless tobacco. She reports current alcohol use. She reports that she does not use drugs.    Review of Systems  Review of Systems   Constitutional: Negative.    HENT: Negative.     Eyes: Negative.    Respiratory: Negative.     Cardiovascular:  Negative for chest pain and palpitations.   Gastrointestinal: Negative.    Endocrine: Negative.    Genitourinary: Negative.    Musculoskeletal: Negative.    Skin: Negative.    Allergic/Immunologic: Negative.    Neurological: Negative.    Hematological: Negative.    Psychiatric/Behavioral: Negative.     All other systems reviewed and are negative.                                 Objective    Vitals:    02/27/25 1335   BP: 117/69   BP Location: Left arm   Patient Position: Sitting   BP Cuff Size: Adult   Pulse: 87   Resp: 20   Temp: 36.3 °C (97.3 °F)   TempSrc: Temporal   SpO2: 98%   Weight: (!) 39 kg (86 lb)    Height: 1.524 m (5')     No LMP recorded.    Physical Exam  Vitals and nursing note reviewed.   Constitutional:       General: She is not in acute distress.     Appearance: Normal appearance. She is not ill-appearing or toxic-appearing.   HENT:      Head: Atraumatic.      Mouth/Throat:      Mouth: Mucous membranes are moist.      Pharynx: Oropharynx is clear.   Eyes:      Extraocular Movements: Extraocular movements intact.      Conjunctiva/sclera: Conjunctivae normal.      Pupils: Pupils are equal, round, and reactive to light.   Cardiovascular:      Rate and Rhythm: Normal rate.   Pulmonary:      Effort: Pulmonary effort is normal.   Musculoskeletal:      Right ankle: Normal.      Left ankle: Tenderness present over the lateral malleolus.   Skin:     General: Skin is warm and dry.   Neurological:      General: No focal deficit present.      Mental Status: She is alert and oriented to person, place, and time.   Psychiatric:         Mood and Affect: Mood normal.         Behavior: Behavior normal.         Thought Content: Thought content normal.         Procedures    Point of Care Test & Imaging Results from this visit  No results found for this visit on 02/27/25.   XR ankle left 3+ views    Result Date: 2/27/2025  Interpreted By:  Carlos Enrique Villar, STUDY: XR ANKLE LEFT 3+ VIEWS; ;  2/27/2025 1:56 pm   INDICATION: Signs/Symptoms:pain.   ,M25.572 Pain in left ankle and joints of left foot   COMPARISON: None.   ACCESSION NUMBER(S): [id number]   ORDERING CLINICIAN: LUDWIG REYES   FINDINGS: By malleolar and syndesmotic internal fixation. No acute hardware complication. Soft tissue swelling. No acute fracture.       By malleolar and syndesmotic internal fixation without acute hardware complication detected     MACRO: None   Signed by: Carlos Enrique Villar 2/27/2025 2:15 PM Dictation workstation:   [id number]     Diagnostic study results (if any) were reviewed by ZOIE Estrada.    Assessment/Plan   Allergies,  medications, history, and pertinent labs/EKGs/Imaging reviewed by ZOIE Estrada.     Medical Decision Making  Pt presents with L ankle pain.  Per chart review, pt had ORIF of tri malleolar fx on 8/6/24.  States she recently noticed stitch coming out of incision and it is painful.  Pt states it was longer but her home health nurse trimmed it down.  States it is still very painful.  Area is not red or inflamed.  There is no drainage noted.  Stitch is embedded in pt's skin near the lateral malleolus.  Xrays of ankle obtained to rule out infection or new injury.  Xrays shows By malleolar and syndesmotic internal fixation. No acute hardware  complication. Soft tissue swelling. No acute fracture per radiology report. Area does not appear to be acutely infected.  Pt has pain medication at home.  Pt advised that she should follow up with her ortho surgeon for further evaluation and treatment. Pt is ambulatory with steady gait.  MSPs are intact.    At time of discharge patient was clinically well-appearing and HDS for outpatient management. The patient was educated regarding diagnosis, supportive care, OTC and Rx medications. The patient was given the opportunity to ask questions prior to discharge.  They verbalized understanding of my discussion of the plans for treatment, expected course, indications to return to UC or seek further evaluation in ED, and the need for timely follow up as directed.        Orders and Diagnoses  Diagnoses and all orders for this visit:  Acute left ankle pain  -     XR ankle left 3+ views; Future      Medical Admin Record      Patient disposition: Home    Electronically signed by ZOIE Estrada  2:57 PM

## 2025-04-09 ENCOUNTER — APPOINTMENT (OUTPATIENT)
Dept: CARDIOLOGY | Facility: CLINIC | Age: 85
End: 2025-04-09
Payer: MEDICARE

## 2025-04-24 ENCOUNTER — APPOINTMENT (OUTPATIENT)
Dept: CARDIOLOGY | Facility: CLINIC | Age: 85
End: 2025-04-24
Payer: MEDICARE

## 2025-05-21 ENCOUNTER — APPOINTMENT (OUTPATIENT)
Dept: CARDIOLOGY | Facility: CLINIC | Age: 85
End: 2025-05-21
Payer: MEDICARE